# Patient Record
Sex: FEMALE | Race: WHITE | Employment: OTHER | URBAN - METROPOLITAN AREA
[De-identification: names, ages, dates, MRNs, and addresses within clinical notes are randomized per-mention and may not be internally consistent; named-entity substitution may affect disease eponyms.]

---

## 2018-08-02 ENCOUNTER — HOSPITAL ENCOUNTER (OUTPATIENT)
Dept: PAIN MANAGEMENT | Age: 83
Discharge: HOME | End: 2018-08-02
Payer: MEDICARE

## 2018-08-02 ENCOUNTER — HOSPITAL ENCOUNTER (OUTPATIENT)
Age: 83
Discharge: HOME | End: 2018-08-02
Payer: MEDICARE

## 2018-08-02 VITALS
WEIGHT: 196 LBS | HEIGHT: 61 IN | TEMPERATURE: 98.3 F | BODY MASS INDEX: 37 KG/M2 | HEART RATE: 94 BPM | RESPIRATION RATE: 16 BRPM | SYSTOLIC BLOOD PRESSURE: 154 MMHG | DIASTOLIC BLOOD PRESSURE: 89 MMHG

## 2018-08-02 DIAGNOSIS — M51.26 DISPLACEMENT OF LUMBAR INTERVERTEBRAL DISC WITHOUT MYELOPATHY: Chronic | ICD-10-CM

## 2018-08-02 DIAGNOSIS — M48.061 SPINAL STENOSIS, LUMBAR REGION, WITHOUT NEUROGENIC CLAUDICATION: Chronic | ICD-10-CM

## 2018-08-02 DIAGNOSIS — M48.061 SPINAL STENOSIS, LUMBAR REGION, WITHOUT NEUROGENIC CLAUDICATION: Primary | Chronic | ICD-10-CM

## 2018-08-02 LAB
BUN BLDV-MCNC: 22 MG/DL (ref 8–23)
CREAT SERPL-MCNC: 0.89 MG/DL (ref 0.5–0.9)
GFR AFRICAN AMERICAN: >60 ML/MIN
GFR NON-AFRICAN AMERICAN: >60 ML/MIN
GFR SERPL CREATININE-BSD FRML MDRD: NORMAL ML/MIN/{1.73_M2}
GFR SERPL CREATININE-BSD FRML MDRD: NORMAL ML/MIN/{1.73_M2}

## 2018-08-02 PROCEDURE — 82565 ASSAY OF CREATININE: CPT

## 2018-08-02 PROCEDURE — 84520 ASSAY OF UREA NITROGEN: CPT

## 2018-08-02 PROCEDURE — 99204 OFFICE O/P NEW MOD 45 MIN: CPT | Performed by: PAIN MEDICINE

## 2018-08-02 PROCEDURE — 99203 OFFICE O/P NEW LOW 30 MIN: CPT

## 2018-08-02 PROCEDURE — 36415 COLL VENOUS BLD VENIPUNCTURE: CPT

## 2018-08-02 RX ORDER — MECLIZINE HYDROCHLORIDE 25 MG/1
25 TABLET ORAL 3 TIMES DAILY PRN
COMMUNITY

## 2018-08-02 ASSESSMENT — ENCOUNTER SYMPTOMS
ABDOMINAL PAIN: 0
BOWEL INCONTINENCE: 0
EYE DISCHARGE: 0
BACK PAIN: 1
HEMOPTYSIS: 0

## 2018-08-02 NOTE — PROGRESS NOTES
HPI:     Back Pain   This is a chronic problem. The current episode started more than 1 year ago. The problem occurs constantly. The problem has been gradually worsening since onset. The pain is present in the lumbar spine and gluteal. The quality of the pain is described as stabbing. The pain radiates to the left foot and right foot. The pain is at a severity of 8/10. The pain is moderate. The pain is the same all the time. The symptoms are aggravated by bending, twisting and standing. Associated symptoms include leg pain, numbness, tingling and weakness. Pertinent negatives include no abdominal pain, bladder incontinence, bowel incontinence, chest pain, fever or headaches. She has tried analgesics, heat and NSAIDs for the symptoms. The treatment provided moderate relief. Previous L4 5 fusion. Had epidural steroid injection years ago with good benefit. Physical therapy with minimal benefit. CT scan lumbar spine from 2017 with multilevel degenerative changes and stable fusion, also shows degenerative SI joint changes. Pain continues to radiate down her legs and she feels like her legs are giving out. Patient denies any new neurological symptoms. No bowel or bladder incontinence, no weakness, and no falling. Review of OARRS does not show any aberrant prescription behavior. Medication is helping the patient stay active. Patient denies any side effects and reports adequate analgesia. No sign of misuse/abuse.     Past Medical History:   Diagnosis Date    CVA (cerebral infarction)     tia    Examination of participant in clinical trial 6/28/11    End date 2/2015       Past Surgical History:   Procedure Laterality Date    BACK SURGERY      NERVE BLOCK  7-21-14    caudal epidural #1, decadron 7mg, fentanyl 25mcg    NERVE BLOCK  8/4/2014    caudal# 2,celestone 6mg    NERVE BLOCK  8/18/14    caudal #3 decadron 10mg    SHOULDER SURGERY      TONSILLECTOMY      TOTAL KNEE ARTHROPLASTY         Allergies back pain  Lumbar radiculopathy  Lumbar postlaminectomy syndrome  Sacroiliitis    Treatment Plan:  DISCUSSION: Treatment options discussed with patient and all questions answered to patient's satisfaction. OARRS Review: Reviewed and acceptable for medications prescribed. TREATMENT OPTIONS:     Discussed the importance of continued physical therapy and exercise program as well. She feels the pain is quite severe she's requesting injections, pain in the low back and legs continues despite conservative measures, may benefit from epidural steroid injections, we'll try the caudal approach. States that she would consider repeat surgery if the injections do not help, we'll obtain an updated MRI of the lumbar spine as well to further evaluate pathology guide treatment plan.           Claire Russell M.D.

## 2018-08-07 ENCOUNTER — HOSPITAL ENCOUNTER (OUTPATIENT)
Dept: PAIN MANAGEMENT | Age: 83
Discharge: HOME | End: 2018-08-07
Payer: MEDICARE

## 2018-08-07 VITALS
RESPIRATION RATE: 18 BRPM | TEMPERATURE: 97.5 F | HEART RATE: 88 BPM | OXYGEN SATURATION: 99 % | WEIGHT: 196 LBS | HEIGHT: 61 IN | SYSTOLIC BLOOD PRESSURE: 139 MMHG | BODY MASS INDEX: 37 KG/M2 | DIASTOLIC BLOOD PRESSURE: 88 MMHG

## 2018-08-07 DIAGNOSIS — M54.42 CHRONIC BILATERAL LOW BACK PAIN WITH BILATERAL SCIATICA: Chronic | ICD-10-CM

## 2018-08-07 DIAGNOSIS — M54.41 CHRONIC BILATERAL LOW BACK PAIN WITH BILATERAL SCIATICA: Chronic | ICD-10-CM

## 2018-08-07 DIAGNOSIS — G89.29 CHRONIC BACK PAIN, UNSPECIFIED BACK LOCATION, UNSPECIFIED BACK PAIN LATERALITY: ICD-10-CM

## 2018-08-07 DIAGNOSIS — M54.9 CHRONIC BACK PAIN, UNSPECIFIED BACK LOCATION, UNSPECIFIED BACK PAIN LATERALITY: ICD-10-CM

## 2018-08-07 DIAGNOSIS — G89.29 CHRONIC BILATERAL LOW BACK PAIN WITH BILATERAL SCIATICA: Chronic | ICD-10-CM

## 2018-08-07 DIAGNOSIS — Z98.1 HISTORY OF LUMBAR FUSION: Chronic | ICD-10-CM

## 2018-08-07 LAB — GLUCOSE BLD-MCNC: 116 MG/DL (ref 65–105)

## 2018-08-07 PROCEDURE — 6360000002 HC RX W HCPCS: Performed by: ANESTHESIOLOGY

## 2018-08-07 PROCEDURE — 64484 NJX AA&/STRD TFRM EPI L/S EA: CPT

## 2018-08-07 PROCEDURE — 64483 NJX AA&/STRD TFRM EPI L/S 1: CPT | Performed by: ANESTHESIOLOGY

## 2018-08-07 PROCEDURE — 99152 MOD SED SAME PHYS/QHP 5/>YRS: CPT | Performed by: ANESTHESIOLOGY

## 2018-08-07 PROCEDURE — 64483 NJX AA&/STRD TFRM EPI L/S 1: CPT

## 2018-08-07 PROCEDURE — 82947 ASSAY GLUCOSE BLOOD QUANT: CPT

## 2018-08-07 RX ORDER — FENTANYL CITRATE 50 UG/ML
INJECTION, SOLUTION INTRAMUSCULAR; INTRAVENOUS
Status: COMPLETED | OUTPATIENT
Start: 2018-08-07 | End: 2018-08-07

## 2018-08-07 RX ADMIN — FENTANYL CITRATE 50 MCG: 50 INJECTION, SOLUTION INTRAMUSCULAR; INTRAVENOUS at 10:31

## 2018-08-07 ASSESSMENT — PAIN DESCRIPTION - DESCRIPTORS: DESCRIPTORS: CONSTANT;STABBING;SHARP

## 2018-08-07 ASSESSMENT — PAIN SCALES - GENERAL
PAINLEVEL_OUTOF10: 0
PAINLEVEL_OUTOF10: 0

## 2018-08-07 ASSESSMENT — PAIN - FUNCTIONAL ASSESSMENT: PAIN_FUNCTIONAL_ASSESSMENT: 0-10

## 2018-08-07 NOTE — H&P
Office visit note dated August 2, 2018 in Epic as all required elements of H&P    Patient seen preop, chart reviewed, AAO x 3, in NAD, VSS,. No changes in medical history since last evaluation. Risk / Benefits explained to patient, patient agree to proceed with plan.   ASA 3  MP 3

## 2018-08-13 ENCOUNTER — APPOINTMENT (OUTPATIENT)
Dept: PHYSICAL THERAPY | Facility: CLINIC | Age: 83
End: 2018-08-13
Payer: MEDICARE

## 2018-08-16 ENCOUNTER — HOSPITAL ENCOUNTER (OUTPATIENT)
Dept: PHYSICAL THERAPY | Facility: CLINIC | Age: 83
Setting detail: THERAPIES SERIES
Discharge: HOME | End: 2018-08-16
Payer: MEDICARE

## 2018-08-16 PROCEDURE — G0283 ELEC STIM OTHER THAN WOUND: HCPCS

## 2018-08-16 PROCEDURE — G8979 MOBILITY GOAL STATUS: HCPCS

## 2018-08-16 PROCEDURE — G8978 MOBILITY CURRENT STATUS: HCPCS

## 2018-08-16 PROCEDURE — 97162 PT EVAL MOD COMPLEX 30 MIN: CPT

## 2018-08-16 PROCEDURE — 97110 THERAPEUTIC EXERCISES: CPT

## 2018-08-16 NOTE — CONSULTS
lacking 8 deg   L3-4 Knee Ext 4- 4- Rotation L 20 deg R 19 deg   L4 Ankle DF 4- 4- Sidebend L 18  deg R 15 deg   L5 EHL 4- 4-       S1 Plant. Flex 4- 4-          TESTS (+/-) LEFT RIGHT Not Tested   SLR [x] sit [] supine + + []   Hamstring (SLR) + + []   Slump/Dural + + []   Sign of the buttock - -    Lhermitte's sign - -    Calle's sign - -    YULIANA + + []   Kasey Tests ? Pain ? Pain No Change Not Tested   RFIL [] [] [] [x]   REIL [] [x] [] []      OBSERVATION No Deficit Deficit Not Tested Comments   Posture           Forward Head [] [x] []     Rounded Shoulders [] [x] []     Kyphosis  X  Swayback posture with slight L lateral shift   Lordosis [] [x] [] Posterior tilt   Iliac Crest [] [x] [] L elevated   Genu Recurvatum [] [x] [] B    Pronation [] [x] [] B   Slumped Sitting [] [x] []     Palpation [] [x] [] B lumbar paraspinals into buttocks   Sensation [] [x] [] B feet, due to diabetic neuropathy     Edema [x] [] []     Neurological [x] [] [] DTRs: B Patellar WNLs;   B Achilles 1+   Gait  x  R medial heel whip, R compensated trendelenburg, waddling type gait, B rear foot valgus      FUNCTION Normal Difficult Unable   Sitting [] [x] []   Standing [] [x] []   Ambulation [] [x] []   Groom/Dress [] [x] []   Lift/Carry [] [x] []   Stairs [] [x] []   Bending [] [x] []   OH reach [] [x] []   Sit to Stand [] [x] []      Comments: Oswestry LBP Score =33 /50 =66% deficit  Bed Mobility CGA/Min Ax1   Transfers SBA/Supervision  TUG 17 seconds without AD     Assessment:  Problems:    [x] ?  Low Back Pain: 10/10 at worst  [x] ? Lumbar ROM: All planes  [x] ? Strength: grossly throughout BLEs  [x] ? Function: Oswestry LBP Score =33 /50 =66% deficit  [x] Postural Deviations B Upper & Lower Crossed Syndrome, L Lateral shift  [x] Gait Deviations: R medial heel whip, R compensated trendelenburg, waddling type gait, B rear foot valgus  [x] Other: Increased TUG (17 seconds without AD), Reduced Bed Mobility CGA/Min Ax1 & Transfers Electrical Stimulation  [x] Gait Training                 [x]Massage                           [x] Lumbar/Cervical Traction  [x] Neuromuscular Re-education   [x] Cold/hotpack     [x] Instruction in HEP                                                                                            [x] Manual Therapy                   [x] Other: Vasocompression/Dry Needling     Frequency:  1-2 x/week for 18 visits     Todays Treatment:  Modalities: IFC (2x4 electrodes) to LB with HP for 15min in prone  Precautions: Tape allergy (adhesive); H/O: L4-5 fusion; R TIA,  B TKA, R RICK; B foot neuropathy; decreased bed mobility; fall risk  Exercises: HEP Review   Exercise Reps/ Time Weight/ Level Comments               Prone lying 2'       ANTOINE 2'         Prone press ups  2x5     Glute sets 10x5\"     Other: lumbar roll education    Specific Instructions for next treatment: Assess response to HEP; if good response, progress extension directional preference, if not, switch to hooklying; dynamic lumbar stabilization, BLE strength & flexibility (H/S, hip flexors, gastrocs) ex,  progress to standing/dynamic balance activities as tolerated, modalities prn, gait training, bed mobility & safety with overall transfers     G-Codes: set at eval 8/16/18  Functional Limitation: Mobility  Functional Assessment Used: Oswestry  Current Status Modifier: CL (33/50 66% deficit)   Goal Status Modifier: CJ    Estimated Medicare Costs as of 8/16/18:  $113.96    Evaluation Complexity:  History (Personal factors, comorbidities) [] 0 [] 1-2 [x] 3+   Exam (limitations, restrictions) [] 1-2 [] 3 [x] 4+   Clinical presentation (progression) [] Stable [x] Evolving  [] Unstable   Decision Making [] Low [x] Moderate [] High    [] Low Complexity [x] Moderate Complexity [] High Complexity       Treatment Charges: Mins Units   [x] Evaluation       []  Low       [x]  Moderate       []  High 35 1   [x]  Modalities IFC/HP 15/15 1/0   [x]  Ther Exercise 8 1   []

## 2018-08-16 NOTE — PLAN OF CARE
herself. Pt reports occasional bowel incontinence for about 2-3 yrs, that PCP is aware of per pt.          PMHx: [] Unremarkable            [x] Diabetes         [x] HTN                [] Pacemaker              [] MI/Heart Problems     [] Cancer           [x] Arthritis           [x] Other: H/O: B foot neuropathy,  L4-5 fusion;R TIA, R Shoulder Arthroscopy, & B TKA, R RICK, thyroid issues,               [x] Refer to full medical chart  In EPIC   Tests:  [x] MRI: Scheduled for    8/17/18            [x] Other: Transforaminal lumbar epidural steroid injection at the levels of S1 B sides under fluoroscopic guidance - 1st of 2 step series done 8/7/18   Lumbar CT 8/3/17: No acute fracture or dislocation. Post surgical hardware is intact. Multilevel degenerative changes. No significant spinal canal stenosis. Indeterminate hyperdense lesion within the right kidney. Recommend further characterization with cross-sectional imaging utilizing renal mass protocol. Assessment:  Problems:    [x] ? Low Back Pain: 10/10 at worst  [x] ? Lumbar ROM: All planes  [x] ? Strength: grossly throughout BLEs  [x] ? Function: Oswestry LBP Score =33 /50 =66% deficit  [x] Postural Deviations B Upper & Lower Crossed Syndrome, L Lateral shift  [x] Gait Deviations: R medial heel whip, R compensated trendelenburg, waddling type gait, B rear foot valgus  [x] Other: Increased TUG (17 seconds without AD), Reduced Bed Mobility CGA/Min Ax1 & Transfers  Supervision/SBA                    STG: (to be met in 9 treatments)  1. ? Pain: To <6/10 with activity to increase ease with ADLS  2. ? ROM: Lumbar by 2cm, B Lumbar ROT/SB/Extension by 5 degrees to improve daily function. 3. ? Strength: BLE throughout by 1/2 to a full grade to improve standing/walking/lifting endurance. 4. Independent with Home Exercise Programs  5. Demonstrate Knowledge of fall prevention. Fall prevention information given 8/16/18.  Pt. with good understanding.      LTG: (to be met in 18

## 2018-08-17 ENCOUNTER — HOSPITAL ENCOUNTER (OUTPATIENT)
Dept: MRI IMAGING | Age: 83
Discharge: HOME | End: 2018-08-19
Payer: MEDICARE

## 2018-08-17 DIAGNOSIS — M51.26 DISPLACEMENT OF LUMBAR INTERVERTEBRAL DISC WITHOUT MYELOPATHY: Chronic | ICD-10-CM

## 2018-08-17 DIAGNOSIS — M48.061 SPINAL STENOSIS, LUMBAR REGION, WITHOUT NEUROGENIC CLAUDICATION: Chronic | ICD-10-CM

## 2018-08-17 PROCEDURE — 72158 MRI LUMBAR SPINE W/O & W/DYE: CPT

## 2018-08-17 PROCEDURE — A9579 GAD-BASE MR CONTRAST NOS,1ML: HCPCS

## 2018-08-17 PROCEDURE — 6360000004 HC RX CONTRAST MEDICATION

## 2018-08-17 RX ADMIN — GADOTERIDOL 19 ML: 279.3 INJECTION, SOLUTION INTRAVENOUS at 15:15

## 2018-08-21 ENCOUNTER — HOSPITAL ENCOUNTER (OUTPATIENT)
Dept: PAIN MANAGEMENT | Age: 83
Discharge: HOME | End: 2018-08-21
Payer: MEDICARE

## 2018-08-21 VITALS
BODY MASS INDEX: 37 KG/M2 | RESPIRATION RATE: 18 BRPM | SYSTOLIC BLOOD PRESSURE: 127 MMHG | TEMPERATURE: 97.9 F | HEIGHT: 61 IN | HEART RATE: 88 BPM | DIASTOLIC BLOOD PRESSURE: 54 MMHG | WEIGHT: 196 LBS | OXYGEN SATURATION: 95 %

## 2018-08-21 DIAGNOSIS — Z98.1 HISTORY OF LUMBAR FUSION: ICD-10-CM

## 2018-08-21 DIAGNOSIS — M54.9 CHRONIC BACK PAIN, UNSPECIFIED BACK LOCATION, UNSPECIFIED BACK PAIN LATERALITY: ICD-10-CM

## 2018-08-21 DIAGNOSIS — G89.29 CHRONIC BILATERAL LOW BACK PAIN WITH BILATERAL SCIATICA: Primary | Chronic | ICD-10-CM

## 2018-08-21 DIAGNOSIS — M54.41 CHRONIC BILATERAL LOW BACK PAIN WITH BILATERAL SCIATICA: Primary | Chronic | ICD-10-CM

## 2018-08-21 DIAGNOSIS — G89.29 CHRONIC BACK PAIN, UNSPECIFIED BACK LOCATION, UNSPECIFIED BACK PAIN LATERALITY: ICD-10-CM

## 2018-08-21 DIAGNOSIS — M54.42 CHRONIC BILATERAL LOW BACK PAIN WITH BILATERAL SCIATICA: Primary | Chronic | ICD-10-CM

## 2018-08-21 LAB — GLUCOSE BLD-MCNC: 147 MG/DL (ref 65–105)

## 2018-08-21 PROCEDURE — 6360000002 HC RX W HCPCS: Performed by: ANESTHESIOLOGY

## 2018-08-21 PROCEDURE — 64483 NJX AA&/STRD TFRM EPI L/S 1: CPT

## 2018-08-21 PROCEDURE — 64484 NJX AA&/STRD TFRM EPI L/S EA: CPT

## 2018-08-21 PROCEDURE — 6360000004 HC RX CONTRAST MEDICATION

## 2018-08-21 PROCEDURE — 6360000002 HC RX W HCPCS

## 2018-08-21 PROCEDURE — 82947 ASSAY GLUCOSE BLOOD QUANT: CPT

## 2018-08-21 PROCEDURE — 2500000003 HC RX 250 WO HCPCS

## 2018-08-21 PROCEDURE — 99152 MOD SED SAME PHYS/QHP 5/>YRS: CPT | Performed by: ANESTHESIOLOGY

## 2018-08-21 PROCEDURE — 64483 NJX AA&/STRD TFRM EPI L/S 1: CPT | Performed by: ANESTHESIOLOGY

## 2018-08-21 RX ORDER — FENTANYL CITRATE 50 UG/ML
INJECTION, SOLUTION INTRAMUSCULAR; INTRAVENOUS
Status: COMPLETED | OUTPATIENT
Start: 2018-08-21 | End: 2018-08-21

## 2018-08-21 RX ADMIN — FENTANYL CITRATE 50 MCG: 50 INJECTION INTRAMUSCULAR; INTRAVENOUS at 10:00

## 2018-08-21 ASSESSMENT — PAIN DESCRIPTION - DESCRIPTORS: DESCRIPTORS: CONSTANT;SHARP;STABBING

## 2018-08-21 ASSESSMENT — PAIN - FUNCTIONAL ASSESSMENT: PAIN_FUNCTIONAL_ASSESSMENT: 0-10

## 2018-08-21 NOTE — OP NOTE
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 Procedure: The Patient was seen in the preop area, chart was reviewed, informed consent was obtained. Patient was taken to procedure room and was placed in prone position. Vital signs were monitored through out the  Procedure. A time out was completed. The skin over the back was prepped and draped in sterile manner. The target point was marked in ipsilateral obliques just below the pedicle at the target levels. Skin and deep tissues were anesthetized with 1 % lidocaine. A 20-gauge, spinal needle was advanced  under fluoroscopy guidance in AP / Oblique and lateral views, such that the tip of the needle lies in the neuroforamina at about the 6 o'clock position under the pedicle of the target vertebra. This was confirmed with AP and lateral views of the fluoroscopy. Then after negative aspiration contrast dye Omnipaque-180 was injected with live fluoroscopy in AP views that showed  spread of the contrast in the epidural space  and no vascular runoff or intrathecal spread. Finally 3 ml of treatment solution containing 5 ml of  1 % PF lidocaine and 1 ml of dexamethasone 10 mg / ml was injected. The needle was removed and a Band-Aid was placed over the needle  insertion site. The same procedure was then repeated at left at S 1 level with same technique. The remaining 3 ml of treatment solution was injected at that.  The total dose of steroid injected today was dexamethasone mg. The patient's vital signs remained stable and the patient tolerated the procedure well.         Electronically signed by Amadou Vargas MD on 8/21/2018 at 10:10 AM

## 2018-08-21 NOTE — FLOWSHEET NOTE
[] Oumar Sloan        Outpatient Physical                Therapy       [address]       Phone: [phone]       Fax: [phone] [x] [facility]       Promotion at [address] [facility]       Phone: [phone]       Fax: [phone] [] [facility]      for Health Promotion     [address] [facility]      Phone: [phone]      Fax:  [phone] [facility] Outpatient    [address] [facility] [address]  Phone [phone]  Fax  [phone]     Physical Therapy Cancel/No Show note    Date: 2018  Patient: Vibha Syed  : 1931  MRN: [id number]    Cancels/No Shows to date:    For 18 appointment patient:  [x]  Cancelled  []  Rescheduled appointment  []  No-show     Reason given by patient:  []  Patient ill  [x]  Conflicting appointment  []  No transportation    []  Conflict with work  []  No reason given  []  Weather related  [x]  Other:      Comments:  Pt going to foot doctor.     [x]  Next appointment was confirmed    Electronically signed by: Ambar Whitaker PTA

## 2018-08-22 ENCOUNTER — HOSPITAL ENCOUNTER (OUTPATIENT)
Dept: PHYSICAL THERAPY | Facility: CLINIC | Age: 83
Setting detail: THERAPIES SERIES
Discharge: HOME | End: 2018-08-22
Payer: MEDICARE

## 2018-08-24 ENCOUNTER — HOSPITAL ENCOUNTER (OUTPATIENT)
Dept: PHYSICAL THERAPY | Facility: CLINIC | Age: 83
Setting detail: THERAPIES SERIES
Discharge: HOME | End: 2018-08-24
Payer: MEDICARE

## 2018-08-24 PROCEDURE — G0283 ELEC STIM OTHER THAN WOUND: HCPCS

## 2018-08-24 PROCEDURE — 97110 THERAPEUTIC EXERCISES: CPT

## 2018-08-24 NOTE — FLOWSHEET NOTE
[] Chichi Horan       Outpatient Physical        Therapy       [address]       Phone: [phone]       Fax: [phone] [x] [facility] at [address]       Phone: [phone]       Fax: [phone] [] [facility]. [facility] Promotion  [address] [place] [address]   Phone: [phone]   Fax:  [phone]     Physical Therapy Daily Treatment Note    Date:  2018  Patient Name:  Courtney Cm    :  1931  MRN: [id number]  Physician: Rosa Maria Russo MD            Insurance: [facility]  Medical Diagnosis:   X87.450 (ICD-10-CM) - Spinal stenosis, lumbar region, without neurogenic claudication   M51.26 (ICD-10-CM) - Displacement of lumbar intervertebral disc without myelopathy   Rehab Codes: M54.9, M54.5, M79.1, R26.2NEC, R29.3, M62.81, R20.2, Z91.81  Onset Date:                    Next 's appt.: 18 (caudal injection #2)  Visit# / total visits:   Cancels/No Shows: 1/0    Subjective:    Pain:  [] Yes  [] No Location: LBP into B buttocks  Pain Rating: (0-10 scale) 4/10  Pain altered Tx:  [] No  [] Yes  Action:  Comments: Pt states she is having some low back pain today. Reports of not doing HEP, states she has been very busy but will start doing HEP. Objective:  Modalities: IFC (2x4 electrodes) to LB with HP for 15min in prone  Precautions: Tape allergy (adhesive); H/O: L4-5 fusion; R TIA,  B TKA, R RICK; B foot neuropathy; decreased bed mobility; fall risk  Exercises: HEP Review   Exercise Reps/ Time Weight/ Level Comments   Scifit 6\" Lv 1 Own pace         Gastroc stretch 3x30\" Slant board                 Prone lying 2'       ANTOINE 2'         Prone press ups  3x5       Glute sets 10x5\"       Hip flex stretch 3x20\" manual          Supine      HS stretch 3x20\" manual    marches 15x ea     bridges 2x  Increased pain.    Hip ADD 20x     LTR 10x3\"     Other: lumbar roll education     Specific

## 2018-08-28 ENCOUNTER — HOSPITAL ENCOUNTER (OUTPATIENT)
Dept: PHYSICAL THERAPY | Facility: CLINIC | Age: 83
Setting detail: THERAPIES SERIES
Discharge: HOME | End: 2018-08-28
Payer: MEDICARE

## 2018-08-28 PROCEDURE — 97032 APPL MODALITY 1+ESTIM EA 15: CPT

## 2018-08-28 PROCEDURE — 97110 THERAPEUTIC EXERCISES: CPT

## 2018-08-28 NOTE — FLOWSHEET NOTE
[] Samara De Leon       Outpatient Physical        Therapy       [address]       Phone: [phone]       Fax: [phone] [x] [facility] at [address]       Phone: [phone]       Fax: [phone] [] [facility]. [address] [place] [address] Promotion  [address] [place] [address]   Phone: [phone]   Fax:  [phone]     Physical Therapy Daily Treatment Note    Date:  2018  Patient Name:  Tennille Ramirez    :  1931  MRN: [id number]  Physician: Jayson Howell MD            Insurance: [employer]  Medical Diagnosis:   D94.064 (ICD-10-CM) - Spinal stenosis, lumbar region, without neurogenic claudication   M51.26 (ICD-10-CM) - Displacement of lumbar intervertebral disc without myelopathy   Rehab Codes: M54.9, M54.5, M79.1, R26.2NEC, R29.3, M62.81, R20.2, Z91.81  Onset Date:                    Next 's appt.: 18 (caudal injection #2)  Visit# / total visits: 3/18  Cancels/No Shows: 1/0    Subjective:    Pain:  [x] Yes  [] No Location: LBP into B buttocks  Pain Rating: (0-10 scale) 4-5/10  Pain altered Tx:  [] No  [] Yes  Action:  Comments: Pt states she is having pain in LB and into bilateral buttocks. Pt states the increased pain started Saturday afternoon. Pt states she did not do anything over the weekend. Pt admits to not being compliant with HEP.       Objective:  Modalities: IFC (2x4 electrodes) to LB with HP for 15min in prone   Precautions: Tape allergy (adhesive); H/O: L4-5 fusion; R TIA,  B TKA, R RICK; B foot neuropathy; decreased bed mobility; fall risk  Exercises: HEP Review   Exercise Reps/ Time Weight/ Level Comments   Scifit 6\" Lv 1 Own pace         Gastroc stretch 3x30\" Slant board                 Prone lying 2'       ANTOINE 2'         Prone press ups  3x5    Held today    Glute sets 10x5\"       Hip flex stretch 3x20\" manual          Supine      HS stretch 3x20\" manual    marches 20x ea bridges 2x  Increased pain. Hip ADD 20x     LTR 20x3\"     Other: lumbar roll education     Specific Instructions for next treatment: Assess response to HEP; if good response, progress extension directional preference, if not, switch to hooklying; dynamic lumbar stabilization, BLE strength & flexibility (H/S, hip flexors, gastrocs) ex,  progress to standing/dynamic balance activities as tolerated, modalities prn, gait training, bed mobility & safety with overall transfers        Treatment Charges: Mins Units   [x]  Modalities-HP/IFC 15/15 0/1   [x]  Ther Exercise 30 2   []  Manual Therapy     []  Ther Activities     []  Aquatics     []  Vasocompression     []  Other     Total Treatment time 45 3     Estimated Medicare Costs as of 8/28/18:  $239.72    Assessment: [x] Progressing toward goals. Poor tolerance to prone press ups this date with increased pain noted. Continued with cuing for exercise technique. Pt required CGA/Josafat for bed mobility. [] No change. [] Other:    STG: (to be met in 9 treatments)  1. ? Pain: To <6/10 with activity to increase ease with ADLS  2. ? ROM: Lumbar by 2cm, B Lumbar ROT/SB/Extension by 5 degrees to improve daily function. 3. ? Strength: BLE throughout by 1/2 to a full grade to improve standing/walking/lifting endurance. 4. Independent with Home Exercise Programs  5. Demonstrate Knowledge of fall prevention. Fall prevention information given 8/16/18. Pt. with good understanding.      LTG: (to be met in 18 treatments)  1. ? Pain: To <2-3/10 with activity to increase ease with ADLs  2. ? ROM: lumbar flexion, B ROT/SB/Extension to WFLs to improve daily function. 3. ? Strength: BLE throughout to 4+ to improve standing/walking/lifting endurance.    4. Improve Oswestry LBP Score to <30% impairment to demonstrate improved activity tolerance  5. ? Standing/Walking Function: Pt to verbally report being able to tolerate for >30min, without increase in LBP, to improve quality of life. 6. ? Walking/Balance Function: Pt to perform TUG in <11 sec without AD to demonstrate decreased fall risk and improve dynamic balance   7. Pt to demonstrate modified independence with all bed mobility to improve safety while transferring in bed.      Patient goals: \"get rid of pain\"    Pt. Education:  [x] Yes  [] No  [x] Reviewed Prior HEP/Ed  Method of Education: [x] Verbal  [] Demo  [] Written  Comprehension of Education:  [x] Verbalizes understanding. [x] Demonstrates understanding. [x] Needs review. [] Demonstrates/verbalizes HEP/Ed previously given. Plan: [x] Continue per plan of care.    [] Other:      Time In:1:45           Time Out: 2:40 pm    Electronically signed by:  Carol Posadas PTA

## 2018-08-30 ENCOUNTER — HOSPITAL ENCOUNTER (OUTPATIENT)
Dept: PHYSICAL THERAPY | Facility: CLINIC | Age: 83
Setting detail: THERAPIES SERIES
Discharge: HOME | End: 2018-08-30
Payer: MEDICARE

## 2018-08-30 PROCEDURE — 97110 THERAPEUTIC EXERCISES: CPT

## 2018-08-30 PROCEDURE — G0283 ELEC STIM OTHER THAN WOUND: HCPCS

## 2018-09-04 ENCOUNTER — HOSPITAL ENCOUNTER (OUTPATIENT)
Dept: PHYSICAL THERAPY | Facility: CLINIC | Age: 83
Setting detail: THERAPIES SERIES
Discharge: HOME | End: 2018-09-04

## 2018-09-04 NOTE — DISCHARGE SUMMARY
[] Rashi Astorga        Outpatient Physical                Therapy       [address]       Phone: [phone]       Fax: [phone] [x] [facility] at [address] [facility]       Phone: [phone]       Fax: [phone] [] [place]. [address] [place] [address]     [address] [facility]     Phone: [phone]     Fax:  [phone]     Physical Therapy Discharge Note    Date: 2018      Patient: Citlaly López  : 1931  MRN: [id number]    Physician: Rosa Maria Russo MD            Insurance: [facility] Medicare  Medical Diagnosis:   H48.111 (ICD-10-CM) - Spinal stenosis, lumbar region, without neurogenic claudication   M51.26 (ICD-10-CM) - Displacement of lumbar intervertebral disc without myelopathy   Rehab Codes: M54.9, M54.5, M79.1, R26.2NEC, R29.3, M62.81, R20.2, Z91.81  Onset Date:                    Next 's appt.: 18  Visit# / total visits:             Cancels/No Shows: 1/0      Discharge Status:     [x] Other:  Therapist called and began to leave voice mail for patient to remind her of next appointment time. Family member answered the phone and had informed clinician that [employer] had passed away over the weekend. Electronically signed by Sobia Delaney PT on 2018 at 7:52 PM      If you have any questions or concerns, please don't hesitate to call.   Thank you for your referral.